# Patient Record
Sex: FEMALE | Race: WHITE | Employment: FULL TIME | URBAN - METROPOLITAN AREA
[De-identification: names, ages, dates, MRNs, and addresses within clinical notes are randomized per-mention and may not be internally consistent; named-entity substitution may affect disease eponyms.]

---

## 2019-05-21 RX ORDER — PRENATAL VIT/IRON FUM/FOLIC AC 27MG-0.8MG
1 TABLET ORAL DAILY
COMMUNITY

## 2019-05-23 ENCOUNTER — ANESTHESIA (OUTPATIENT)
Dept: SURGERY | Facility: CLINIC | Age: 39
DRG: 330 | End: 2019-05-23
Payer: COMMERCIAL

## 2019-05-23 ENCOUNTER — ANESTHESIA EVENT (OUTPATIENT)
Dept: SURGERY | Facility: CLINIC | Age: 39
DRG: 330 | End: 2019-05-23
Payer: COMMERCIAL

## 2019-05-23 ENCOUNTER — HOSPITAL ENCOUNTER (INPATIENT)
Facility: CLINIC | Age: 39
LOS: 4 days | Discharge: HOME | DRG: 330 | End: 2019-05-27
Attending: COLON & RECTAL SURGERY | Admitting: COLON & RECTAL SURGERY
Payer: COMMERCIAL

## 2019-05-23 DIAGNOSIS — N82.3 RECTOVAGINAL FISTULA: Primary | ICD-10-CM

## 2019-05-23 LAB
HCG UR QL: NEGATIVE
PLATELET # BLD AUTO: 189 10E9/L (ref 150–450)

## 2019-05-23 PROCEDURE — 25800030 ZZH RX IP 258 OP 636: Performed by: NURSE ANESTHETIST, CERTIFIED REGISTERED

## 2019-05-23 PROCEDURE — 25000125 ZZHC RX 250: Performed by: COLON & RECTAL SURGERY

## 2019-05-23 PROCEDURE — 12000000 ZZH R&B MED SURG/OB

## 2019-05-23 PROCEDURE — 25000125 ZZHC RX 250: Performed by: NURSE ANESTHETIST, CERTIFIED REGISTERED

## 2019-05-23 PROCEDURE — 37000009 ZZH ANESTHESIA TECHNICAL FEE, EACH ADDTL 15 MIN: Performed by: COLON & RECTAL SURGERY

## 2019-05-23 PROCEDURE — 37000008 ZZH ANESTHESIA TECHNICAL FEE, 1ST 30 MIN: Performed by: COLON & RECTAL SURGERY

## 2019-05-23 PROCEDURE — 25800030 ZZH RX IP 258 OP 636: Performed by: ANESTHESIOLOGY

## 2019-05-23 PROCEDURE — 36415 COLL VENOUS BLD VENIPUNCTURE: CPT | Performed by: COLON & RECTAL SURGERY

## 2019-05-23 PROCEDURE — 25000128 H RX IP 250 OP 636: Performed by: NURSE ANESTHETIST, CERTIFIED REGISTERED

## 2019-05-23 PROCEDURE — 25000132 ZZH RX MED GY IP 250 OP 250 PS 637: Performed by: COLON & RECTAL SURGERY

## 2019-05-23 PROCEDURE — 0DQR0ZZ REPAIR ANAL SPHINCTER, OPEN APPROACH: ICD-10-PCS | Performed by: COLON & RECTAL SURGERY

## 2019-05-23 PROCEDURE — 0DQP0ZZ REPAIR RECTUM, OPEN APPROACH: ICD-10-PCS | Performed by: COLON & RECTAL SURGERY

## 2019-05-23 PROCEDURE — 0UQG0ZZ REPAIR VAGINA, OPEN APPROACH: ICD-10-PCS | Performed by: COLON & RECTAL SURGERY

## 2019-05-23 PROCEDURE — 40000170 ZZH STATISTIC PRE-PROCEDURE ASSESSMENT II: Performed by: COLON & RECTAL SURGERY

## 2019-05-23 PROCEDURE — 25000125 ZZHC RX 250: Performed by: ANESTHESIOLOGY

## 2019-05-23 PROCEDURE — 71000012 ZZH RECOVERY PHASE 1 LEVEL 1 FIRST HR: Performed by: COLON & RECTAL SURGERY

## 2019-05-23 PROCEDURE — 0DJD8ZZ INSPECTION OF LOWER INTESTINAL TRACT, VIA NATURAL OR ARTIFICIAL OPENING ENDOSCOPIC: ICD-10-PCS | Performed by: COLON & RECTAL SURGERY

## 2019-05-23 PROCEDURE — 25800030 ZZH RX IP 258 OP 636: Performed by: COLON & RECTAL SURGERY

## 2019-05-23 PROCEDURE — 81025 URINE PREGNANCY TEST: CPT | Performed by: ANESTHESIOLOGY

## 2019-05-23 PROCEDURE — 85049 AUTOMATED PLATELET COUNT: CPT | Performed by: COLON & RECTAL SURGERY

## 2019-05-23 PROCEDURE — 25000128 H RX IP 250 OP 636: Performed by: COLON & RECTAL SURGERY

## 2019-05-23 PROCEDURE — 36000056 ZZH SURGERY LEVEL 3 1ST 30 MIN: Performed by: COLON & RECTAL SURGERY

## 2019-05-23 PROCEDURE — 36000058 ZZH SURGERY LEVEL 3 EA 15 ADDTL MIN: Performed by: COLON & RECTAL SURGERY

## 2019-05-23 PROCEDURE — 27210794 ZZH OR GENERAL SUPPLY STERILE: Performed by: COLON & RECTAL SURGERY

## 2019-05-23 PROCEDURE — 25000566 ZZH SEVOFLURANE, EA 15 MIN: Performed by: COLON & RECTAL SURGERY

## 2019-05-23 RX ORDER — ACETAMINOPHEN 325 MG/1
975 TABLET ORAL ONCE
Status: COMPLETED | OUTPATIENT
Start: 2019-05-23 | End: 2019-05-23

## 2019-05-23 RX ORDER — HYDROMORPHONE HYDROCHLORIDE 1 MG/ML
.3-.5 INJECTION, SOLUTION INTRAMUSCULAR; INTRAVENOUS; SUBCUTANEOUS EVERY 5 MIN PRN
Status: DISCONTINUED | OUTPATIENT
Start: 2019-05-23 | End: 2019-05-23 | Stop reason: HOSPADM

## 2019-05-23 RX ORDER — NALOXONE HYDROCHLORIDE 0.4 MG/ML
.1-.4 INJECTION, SOLUTION INTRAMUSCULAR; INTRAVENOUS; SUBCUTANEOUS
Status: DISCONTINUED | OUTPATIENT
Start: 2019-05-23 | End: 2019-05-27 | Stop reason: HOSPADM

## 2019-05-23 RX ORDER — ONDANSETRON 2 MG/ML
INJECTION INTRAMUSCULAR; INTRAVENOUS PRN
Status: DISCONTINUED | OUTPATIENT
Start: 2019-05-23 | End: 2019-05-23

## 2019-05-23 RX ORDER — PROPOFOL 10 MG/ML
INJECTION, EMULSION INTRAVENOUS CONTINUOUS PRN
Status: DISCONTINUED | OUTPATIENT
Start: 2019-05-23 | End: 2019-05-23

## 2019-05-23 RX ORDER — LIDOCAINE 40 MG/G
CREAM TOPICAL
Status: DISCONTINUED | OUTPATIENT
Start: 2019-05-23 | End: 2019-05-27 | Stop reason: HOSPADM

## 2019-05-23 RX ORDER — ONDANSETRON 4 MG/1
4 TABLET, ORALLY DISINTEGRATING ORAL EVERY 6 HOURS PRN
Status: DISCONTINUED | OUTPATIENT
Start: 2019-05-23 | End: 2019-05-27 | Stop reason: HOSPADM

## 2019-05-23 RX ORDER — SODIUM CHLORIDE, SODIUM LACTATE, POTASSIUM CHLORIDE, CALCIUM CHLORIDE 600; 310; 30; 20 MG/100ML; MG/100ML; MG/100ML; MG/100ML
INJECTION, SOLUTION INTRAVENOUS CONTINUOUS
Status: DISCONTINUED | OUTPATIENT
Start: 2019-05-23 | End: 2019-05-24 | Stop reason: CLARIF

## 2019-05-23 RX ORDER — NALOXONE HYDROCHLORIDE 0.4 MG/ML
.1-.4 INJECTION, SOLUTION INTRAMUSCULAR; INTRAVENOUS; SUBCUTANEOUS
Status: DISCONTINUED | OUTPATIENT
Start: 2019-05-23 | End: 2019-05-23

## 2019-05-23 RX ORDER — ONDANSETRON 2 MG/ML
4 INJECTION INTRAMUSCULAR; INTRAVENOUS EVERY 30 MIN PRN
Status: DISCONTINUED | OUTPATIENT
Start: 2019-05-23 | End: 2019-05-23 | Stop reason: HOSPADM

## 2019-05-23 RX ORDER — ONDANSETRON 4 MG/1
4 TABLET, ORALLY DISINTEGRATING ORAL EVERY 30 MIN PRN
Status: DISCONTINUED | OUTPATIENT
Start: 2019-05-23 | End: 2019-05-23 | Stop reason: HOSPADM

## 2019-05-23 RX ORDER — MAGNESIUM HYDROXIDE 1200 MG/15ML
LIQUID ORAL PRN
Status: DISCONTINUED | OUTPATIENT
Start: 2019-05-23 | End: 2019-05-23 | Stop reason: HOSPADM

## 2019-05-23 RX ORDER — SODIUM CHLORIDE, SODIUM LACTATE, POTASSIUM CHLORIDE, CALCIUM CHLORIDE 600; 310; 30; 20 MG/100ML; MG/100ML; MG/100ML; MG/100ML
INJECTION, SOLUTION INTRAVENOUS CONTINUOUS
Status: DISCONTINUED | OUTPATIENT
Start: 2019-05-23 | End: 2019-05-23 | Stop reason: HOSPADM

## 2019-05-23 RX ORDER — ONDANSETRON 2 MG/ML
4 INJECTION INTRAMUSCULAR; INTRAVENOUS EVERY 6 HOURS PRN
Status: DISCONTINUED | OUTPATIENT
Start: 2019-05-23 | End: 2019-05-27 | Stop reason: HOSPADM

## 2019-05-23 RX ORDER — BUPIVACAINE HYDROCHLORIDE AND EPINEPHRINE 2.5; 5 MG/ML; UG/ML
INJECTION, SOLUTION INFILTRATION; PERINEURAL PRN
Status: DISCONTINUED | OUTPATIENT
Start: 2019-05-23 | End: 2019-05-23 | Stop reason: HOSPADM

## 2019-05-23 RX ORDER — NEOSTIGMINE METHYLSULFATE 1 MG/ML
VIAL (ML) INJECTION PRN
Status: DISCONTINUED | OUTPATIENT
Start: 2019-05-23 | End: 2019-05-23

## 2019-05-23 RX ORDER — FENTANYL CITRATE 50 UG/ML
25-50 INJECTION, SOLUTION INTRAMUSCULAR; INTRAVENOUS
Status: DISCONTINUED | OUTPATIENT
Start: 2019-05-23 | End: 2019-05-23 | Stop reason: HOSPADM

## 2019-05-23 RX ORDER — FENTANYL CITRATE 50 UG/ML
INJECTION, SOLUTION INTRAMUSCULAR; INTRAVENOUS PRN
Status: DISCONTINUED | OUTPATIENT
Start: 2019-05-23 | End: 2019-05-23

## 2019-05-23 RX ORDER — ONDANSETRON 4 MG/1
4 TABLET, FILM COATED ORAL EVERY 8 HOURS PRN
COMMUNITY

## 2019-05-23 RX ORDER — DIPHENHYDRAMINE HYDROCHLORIDE 50 MG/ML
25 INJECTION INTRAMUSCULAR; INTRAVENOUS EVERY 6 HOURS PRN
Status: DISCONTINUED | OUTPATIENT
Start: 2019-05-23 | End: 2019-05-27 | Stop reason: HOSPADM

## 2019-05-23 RX ORDER — PRENATAL VIT/IRON FUM/FOLIC AC 27MG-0.8MG
1 TABLET ORAL DAILY
Status: DISCONTINUED | OUTPATIENT
Start: 2019-05-23 | End: 2019-05-27 | Stop reason: HOSPADM

## 2019-05-23 RX ORDER — PROPOFOL 10 MG/ML
INJECTION, EMULSION INTRAVENOUS PRN
Status: DISCONTINUED | OUTPATIENT
Start: 2019-05-23 | End: 2019-05-23

## 2019-05-23 RX ORDER — CEFOTETAN DISODIUM 1 G/10ML
1 INJECTION, POWDER, FOR SOLUTION INTRAMUSCULAR; INTRAVENOUS SEE ADMIN INSTRUCTIONS
Status: DISCONTINUED | OUTPATIENT
Start: 2019-05-23 | End: 2019-05-23 | Stop reason: HOSPADM

## 2019-05-23 RX ORDER — HYDROMORPHONE HYDROCHLORIDE 1 MG/ML
.3-.5 INJECTION, SOLUTION INTRAMUSCULAR; INTRAVENOUS; SUBCUTANEOUS
Status: DISCONTINUED | OUTPATIENT
Start: 2019-05-23 | End: 2019-05-27 | Stop reason: HOSPADM

## 2019-05-23 RX ORDER — HEPARIN SODIUM 5000 [USP'U]/.5ML
5000 INJECTION, SOLUTION INTRAVENOUS; SUBCUTANEOUS EVERY 8 HOURS
Status: DISCONTINUED | OUTPATIENT
Start: 2019-05-24 | End: 2019-05-27 | Stop reason: HOSPADM

## 2019-05-23 RX ORDER — ACETAMINOPHEN 325 MG/1
975 TABLET ORAL EVERY 6 HOURS PRN
Status: DISCONTINUED | OUTPATIENT
Start: 2019-05-23 | End: 2019-05-27 | Stop reason: HOSPADM

## 2019-05-23 RX ORDER — GINSENG 100 MG
CAPSULE ORAL PRN
Status: DISCONTINUED | OUTPATIENT
Start: 2019-05-23 | End: 2019-05-23 | Stop reason: HOSPADM

## 2019-05-23 RX ORDER — CEFOTETAN DISODIUM 1 G/10ML
1 INJECTION, POWDER, FOR SOLUTION INTRAMUSCULAR; INTRAVENOUS
Status: COMPLETED | OUTPATIENT
Start: 2019-05-23 | End: 2019-05-23

## 2019-05-23 RX ORDER — LIDOCAINE HYDROCHLORIDE 20 MG/ML
INJECTION, SOLUTION INFILTRATION; PERINEURAL PRN
Status: DISCONTINUED | OUTPATIENT
Start: 2019-05-23 | End: 2019-05-23

## 2019-05-23 RX ORDER — DIPHENHYDRAMINE HCL 25 MG
25 CAPSULE ORAL EVERY 6 HOURS PRN
Status: DISCONTINUED | OUTPATIENT
Start: 2019-05-23 | End: 2019-05-27 | Stop reason: HOSPADM

## 2019-05-23 RX ORDER — DIAZEPAM 5 MG
5 TABLET ORAL EVERY 6 HOURS PRN
Status: DISCONTINUED | OUTPATIENT
Start: 2019-05-23 | End: 2019-05-27 | Stop reason: HOSPADM

## 2019-05-23 RX ORDER — GLYCOPYRROLATE 0.2 MG/ML
INJECTION, SOLUTION INTRAMUSCULAR; INTRAVENOUS PRN
Status: DISCONTINUED | OUTPATIENT
Start: 2019-05-23 | End: 2019-05-23

## 2019-05-23 RX ORDER — DEXAMETHASONE SODIUM PHOSPHATE 4 MG/ML
INJECTION, SOLUTION INTRA-ARTICULAR; INTRALESIONAL; INTRAMUSCULAR; INTRAVENOUS; SOFT TISSUE PRN
Status: DISCONTINUED | OUTPATIENT
Start: 2019-05-23 | End: 2019-05-23

## 2019-05-23 RX ADMIN — SODIUM CHLORIDE, POTASSIUM CHLORIDE, SODIUM LACTATE AND CALCIUM CHLORIDE: 600; 310; 30; 20 INJECTION, SOLUTION INTRAVENOUS at 07:57

## 2019-05-23 RX ADMIN — SODIUM CHLORIDE, POTASSIUM CHLORIDE, SODIUM LACTATE AND CALCIUM CHLORIDE: 600; 310; 30; 20 INJECTION, SOLUTION INTRAVENOUS at 22:53

## 2019-05-23 RX ADMIN — PROPOFOL 30 MG: 10 INJECTION, EMULSION INTRAVENOUS at 10:42

## 2019-05-23 RX ADMIN — FENTANYL CITRATE 50 MCG: 50 INJECTION, SOLUTION INTRAMUSCULAR; INTRAVENOUS at 09:19

## 2019-05-23 RX ADMIN — HYDROMORPHONE HYDROCHLORIDE 0.5 MG: 1 INJECTION, SOLUTION INTRAMUSCULAR; INTRAVENOUS; SUBCUTANEOUS at 09:43

## 2019-05-23 RX ADMIN — ONDANSETRON 4 MG: 2 INJECTION INTRAMUSCULAR; INTRAVENOUS at 11:10

## 2019-05-23 RX ADMIN — PROPOFOL 170 MG: 10 INJECTION, EMULSION INTRAVENOUS at 09:22

## 2019-05-23 RX ADMIN — PHENYLEPHRINE HYDROCHLORIDE 100 MCG: 10 INJECTION INTRAVENOUS at 10:53

## 2019-05-23 RX ADMIN — HYDROMORPHONE HYDROCHLORIDE 0.25 MG: 1 INJECTION, SOLUTION INTRAMUSCULAR; INTRAVENOUS; SUBCUTANEOUS at 10:46

## 2019-05-23 RX ADMIN — MIDAZOLAM 1 MG: 1 INJECTION INTRAMUSCULAR; INTRAVENOUS at 09:14

## 2019-05-23 RX ADMIN — PHENYLEPHRINE HYDROCHLORIDE 100 MCG: 10 INJECTION INTRAVENOUS at 10:14

## 2019-05-23 RX ADMIN — HYDROMORPHONE HYDROCHLORIDE 0.5 MG: 1 INJECTION, SOLUTION INTRAMUSCULAR; INTRAVENOUS; SUBCUTANEOUS at 17:32

## 2019-05-23 RX ADMIN — HYDROMORPHONE HYDROCHLORIDE 0.5 MG: 1 INJECTION, SOLUTION INTRAMUSCULAR; INTRAVENOUS; SUBCUTANEOUS at 20:22

## 2019-05-23 RX ADMIN — PROPOFOL 30 MCG/KG/MIN: 10 INJECTION, EMULSION INTRAVENOUS at 09:32

## 2019-05-23 RX ADMIN — LIDOCAINE HYDROCHLORIDE 60 MG: 20 INJECTION, SOLUTION INFILTRATION; PERINEURAL at 09:22

## 2019-05-23 RX ADMIN — HYDROMORPHONE HYDROCHLORIDE 0.3 MG: 1 INJECTION, SOLUTION INTRAMUSCULAR; INTRAVENOUS; SUBCUTANEOUS at 13:19

## 2019-05-23 RX ADMIN — PHENYLEPHRINE HYDROCHLORIDE 50 MCG: 10 INJECTION INTRAVENOUS at 10:05

## 2019-05-23 RX ADMIN — CEFOTETAN DISODIUM 1 G: 1 INJECTION, POWDER, FOR SOLUTION INTRAMUSCULAR; INTRAVENOUS at 09:26

## 2019-05-23 RX ADMIN — ACETAMINOPHEN 975 MG: 325 TABLET, FILM COATED ORAL at 20:22

## 2019-05-23 RX ADMIN — HYDROMORPHONE HYDROCHLORIDE 0.3 MG: 1 INJECTION, SOLUTION INTRAMUSCULAR; INTRAVENOUS; SUBCUTANEOUS at 15:26

## 2019-05-23 RX ADMIN — DEXAMETHASONE SODIUM PHOSPHATE 4 MG: 4 INJECTION, SOLUTION INTRA-ARTICULAR; INTRALESIONAL; INTRAMUSCULAR; INTRAVENOUS; SOFT TISSUE at 09:32

## 2019-05-23 RX ADMIN — MIDAZOLAM 1 MG: 1 INJECTION INTRAMUSCULAR; INTRAVENOUS at 09:19

## 2019-05-23 RX ADMIN — LIDOCAINE HYDROCHLORIDE 0.5 ML: 10 INJECTION, SOLUTION EPIDURAL; INFILTRATION; INTRACAUDAL; PERINEURAL at 07:57

## 2019-05-23 RX ADMIN — NEOSTIGMINE METHYLSULFATE 3 MG: 1 INJECTION, SOLUTION INTRAVENOUS at 11:23

## 2019-05-23 RX ADMIN — ACETAMINOPHEN 975 MG: 325 TABLET, FILM COATED ORAL at 07:57

## 2019-05-23 RX ADMIN — SODIUM CHLORIDE, POTASSIUM CHLORIDE, SODIUM LACTATE AND CALCIUM CHLORIDE: 600; 310; 30; 20 INJECTION, SOLUTION INTRAVENOUS at 10:27

## 2019-05-23 RX ADMIN — FENTANYL CITRATE 50 MCG: 50 INJECTION, SOLUTION INTRAMUSCULAR; INTRAVENOUS at 09:22

## 2019-05-23 RX ADMIN — GLYCOPYRROLATE 0.5 MG: 0.2 INJECTION, SOLUTION INTRAMUSCULAR; INTRAVENOUS at 11:23

## 2019-05-23 RX ADMIN — PHENYLEPHRINE HYDROCHLORIDE 100 MCG: 10 INJECTION INTRAVENOUS at 10:33

## 2019-05-23 RX ADMIN — ROCURONIUM BROMIDE 50 MG: 10 INJECTION INTRAVENOUS at 09:22

## 2019-05-23 RX ADMIN — PHENYLEPHRINE HYDROCHLORIDE 50 MCG: 10 INJECTION INTRAVENOUS at 11:05

## 2019-05-23 RX ADMIN — HYDROMORPHONE HYDROCHLORIDE 0.5 MG: 1 INJECTION, SOLUTION INTRAMUSCULAR; INTRAVENOUS; SUBCUTANEOUS at 22:57

## 2019-05-23 RX ADMIN — PRENATAL VIT W/ FE FUMARATE-FA TAB 27-0.8 MG 1 TABLET: 27-0.8 TAB at 13:54

## 2019-05-23 RX ADMIN — FENTANYL CITRATE 50 MCG: 50 INJECTION, SOLUTION INTRAMUSCULAR; INTRAVENOUS at 10:42

## 2019-05-23 ASSESSMENT — COPD QUESTIONNAIRES: COPD: 0

## 2019-05-23 ASSESSMENT — ACTIVITIES OF DAILY LIVING (ADL)
ADLS_ACUITY_SCORE: 12
ADLS_ACUITY_SCORE: 12

## 2019-05-23 NOTE — ANESTHESIA PREPROCEDURE EVALUATION
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             ROS/MED HX    ENT/Pulmonary:      (-) asthma, COPD and sleep apnea   Neurologic:      (-) CVA and TIA   Cardiovascular:         METS/Exercise Tolerance:     Hematologic:         Musculoskeletal:         GI/Hepatic:     (+) Other GI/Hepatic      Hepatitis: rectalvaginal fistula.   Renal/Genitourinary:         Endo:         Psychiatric:     (+) psychiatric history anxiety and depression      Infectious Disease:         Malignancy:         Other:                          Physical Exam  Normal systems: dental    Airway   Mallampati: II  TM distance: >3 FB  Neck ROM: full    Dental     Cardiovascular   Rhythm and rate: regular      Pulmonary    breath sounds clear to auscultation            Lab Results   Component Value Date    HCG Negative 2019       Preop Vitals  BP Readings from Last 3 Encounters:   No data found for BP    Pulse Readings from Last 3 Encounters:   No data found for Pulse      Resp Readings from Last 3 Encounters:   No data found for Resp    SpO2 Readings from Last 3 Encounters:   No data found for SpO2      Temp Readings from Last 1 Encounters:   No data found for Temp    Ht Readings from Last 1 Encounters:   No data found for Ht      Wt Readings from Last 1 Encounters:   No data found for Wt    There is no height or weight on file to calculate BMI.       Anesthesia Plan      History & Physical Review  History and physical reviewed and following examination; no interval change.    ASA Status:  1 .    NPO Status:  > 8  hours    Plan for General and ETT   PONV prophylaxis:  Ondansetron (or other 5HT-3) and Dexamethasone or Solumedrol       Postoperative Care      Consents  Anesthetic plan, risks, benefits and alternatives discussed with:  Patient..                 Precious Lindo

## 2019-05-23 NOTE — BRIEF OP NOTE
[facility]    Brief Operative Note    Pre-operative diagnosis: RECTOVAGINAL FISTULA   Post-operative diagnosis same  Procedure: Procedure(s):  SPHINCTEROPLASTY, RECTUM, REPAIR OF RECTAL VAGINAL FISTULA  Surgeon: Surgeon(s) and Role:     * Elza Hills MD - Primary     * Madeleine Caceres MD - Fellow - Assisting  Anesthesia: General   Estimated blood loss: Less than 50 ml  Drains:  penrose drain in perineum   Specimens: * None  Findings:   rectovaginal fistula anterior midline, 1m from anal verge.  Complications: None.

## 2019-05-23 NOTE — PLAN OF CARE
A/O x4. AVSS on RA. Up w/ SBA. Pain managed w/ PRN IV dilaudid. Vaginal packing in place. Penrose drain, bloody drainage, fluffs in place. Tolerating clear liquid diet. Basilio in place, patent, adequate output.

## 2019-05-23 NOTE — PROGRESS NOTES
Admission medication history interview status for the 5/23/2019  admission is complete. See EPIC admission navigator for prior to admission medications     Medication history source reliability:Good    Medication history interview source(s):Patient    Medication history resources (including written lists, pill bottles, clinic record):None    Primary pharmacy.[facility]    Additional medication history information not noted on PTA med list :    Patient completed pre-op antibiotics:  Neomycin 1000mg tid X 1 day - last dose on 5/22/19 at 2300  Metronidazole 500mg tid X 1 day - last dose on 5/22/19 at 2300    Time spent in this activity: 45 minutes    Prior to Admission medications    Medication Sig Last Dose Taking? Auth Provider   CALCIUM PO Take 1 tablet by mouth daily 5/21/2019 Yes Reported, Patient   Prenatal Vit-Fe Fumarate-FA (PRENATAL MULTIVITAMIN W/IRON) 27-0.8 MG tablet Take 1 tablet by mouth daily 5/21/2019 Yes Reported, Patient   Probiotic Product (PROBIOTIC PO) Take 1 tablet by mouth daily 5/21/2019 Yes Reported, Patient

## 2019-05-23 NOTE — OP NOTE
Procedure Date: 05/23/2019      PREOPERATIVE DIAGNOSIS:  Rectovaginal fistula with sphincter injury.      POSTOPERATIVE DIAGNOSIS:  Rectovaginal fistula with sphincter injury.      PROCEDURE:  Anal sphincteroplasty with repair of rectovaginal fistula.      SURGEON:  Elza Hills MD      ASSISTANT:  Madeleine Caceres MD      ANESTHESIA:  General.      INDICATIONS:  The patient is a 38-year-old woman who had symptoms of the passage of flatus and stool through her vagina.  Evaluation revealed the presence of a rectovaginal fistula as well as anal ultrasound revealed that anterior sphincter defect in the mid and proximal anal canal.  The options of an endorectal advancement flap and an anal sphincteroplasty were discussed with the patient.  After that discussion, the patient has chosen to proceed with an anal sphincteroplasty.  The nature of the procedure, the usual hospital stay, recovery and risks were reviewed with the patient.  All of her questions were answered.  She understands and wishes to proceed.      OPERATIVE PROCEDURE:  After an outpatient prep, the patient was brought to the operating room.  Her legs were placed in pneumatic compression stockings.  She received intravenous antibiotics.  After the induction of adequate general anesthesia, she in the prone jackknife position.  Her buttocks were taped apart for exposure.  The area was prepped and draped in the usual sterile manner.  A timeout was performed and everyone present in the operating room agreed to the planned procedure.  Inspection of the anal canal with a bivalve anoscope revealed that there was clear evidence of a rectovaginal fistula.  A probe could easily be passed through this into the vagina.  There was very little bulk to the sphincter muscle and there was no separation and other than the rectal and vaginal walls in that area.  The curvilinear incision was made between the rectum and the vagina and extended down through the subcutaneous tissue.   The [place] retractor was put in place.  The sphincter muscle was then mobilized from the ischiorectal fossa.  The distal sphincter was intact, but at the more proximal sphincter there was a complete defect.  This mobilization was taken up laterally to the level of the levators.  Anteriorly, we were mobilized the sphincter muscle and subsequent scar off the posterior vaginal wall.  When we encountered the fistula, we began to dissect the adherent vaginal wall from the rectal wall.  In order to improve visualization, we changed and we changed to mobilization of the anoderm and rectal mucosa off the sphincter muscle and into the rectovaginal septum.  This allowed us to separate the rectal wall from the vaginal wall.  We continued this mobilization until we were clearly proximal to the fistula opening and in soft tissue in the rectovaginal septum.  When that dissection was complete and we completely  the posterior vaginal wall from the anterior rectal wall and we were well into the septum, the wound was irrigated with saline.  Hemostasis was achieved either with cautery or 3-0 Vicryl suture ligation.  The vaginal hole was then closed with a continuous suture of 2-0 Vicryl and then that closure was imbricated with interrupted 2-0 Vicryl sutures.  The rectal wall at the area of the fistula was then trimmed back to healthy tissue.  We then, having sufficient mobilization, sewed the proximal edge of the fistula to the anoderm.  This was done with interrupted 3-0 Vicryl sutures when the blood supply was good and the closure was secure.  We then approximated the proximal and mid portion of the duct  the sphincter occurred in the midline.  After they were approximated, the suture line of the sphincter repair was then reinforced with Lembert sutures in the mid and proximal anal canal.  In the distal anal canal, the intact sphincter muscle was imbricated with 2-0 PDS suture.  When hemostasis was checked for  and felt to be adequate, the subcutaneous tissue was closed with 3-0 Vicryl suture.  The anoderm was tacked to the sphincter repair with 3-0 Vicryl suture.  The quarter-inch Penrose drain was placed in the rectovaginal septum.  The anal end of the incision, which was now being closed in a vertical fashion, was partially closed with 3-0 Vicryl suture.  The vaginal side was closed with a subcuticular suture of 4-0 Monocryl.  A was well-lubricated vaginal packing was placed into the vagina to with hemostasis.  A perianal nerve block using 0.25% Marcaine with epinephrine was then induced.  Fluff dressings were placed over the repair.  All sponge, blade and needle counts were reported as correct x 2.  The patient was extubated and transferred to the recovery room in stable condition.         DARWIN ALBERT MD             D: 2019   T: 2019   MT: GH      Name:     TAWNY GEIGER   MRN:      [id number]        Account:        [id number]   :      1980           Procedure Date: 2019      Document: [id number]       cc: Darwin GEIGER MD

## 2019-05-23 NOTE — PROGRESS NOTES
Patient completed pre-op antibiotics:  Neomycin 1000mg tid X 1 day - last dose on 5/22/19 at 2300  Metronidazole 500mg tid X 1 day - last dose on 5/22/19 at 2300

## 2019-05-23 NOTE — ANESTHESIA CARE TRANSFER NOTE
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    Anesthesia Type:   General, ETT     Note:  Airway :Face Mask  Patient transferred to:PACU  Comments: Spont. Resps, pt. Responding.  Extubated, sufficient air exchange. To PACU VSS, Monitors on. Report to RNHandoff Report: Identifed the Patient, Identified the Reponsible Provider, Reviewed the pertinent medical history, Discussed the surgical course, Reviewed Intra-OP anesthesia mangement and issues during anesthesia, Set expectations for post-procedure period and Allowed opportunity for questions and acknowledgement of understanding      Vitals: (Last set prior to Anesthesia Care Transfer)    CRNA VITALS  5/23/2019 1102 - 5/23/2019 1140      5/23/2019             Resp Rate (set):  10                Electronically Signed By: EVELYN Tong CRNA  May 23, 2019  11:40 AM

## 2019-05-24 LAB
ANION GAP SERPL CALCULATED.3IONS-SCNC: 6 MMOL/L (ref 3–14)
BUN SERPL-MCNC: 7 MG/DL (ref 7–30)
CALCIUM SERPL-MCNC: 8.6 MG/DL (ref 8.5–10.1)
CHLORIDE SERPL-SCNC: 104 MMOL/L (ref 94–109)
CO2 SERPL-SCNC: 30 MMOL/L (ref 20–32)
CREAT SERPL-MCNC: 0.58 MG/DL (ref 0.52–1.04)
GFR SERPL CREATININE-BSD FRML MDRD: >90 ML/MIN/{1.73_M2}
GLUCOSE SERPL-MCNC: 121 MG/DL (ref 70–99)
HGB BLD-MCNC: 12.4 G/DL (ref 11.7–15.7)
POTASSIUM SERPL-SCNC: 3.5 MMOL/L (ref 3.4–5.3)
SODIUM SERPL-SCNC: 140 MMOL/L (ref 133–144)

## 2019-05-24 PROCEDURE — 25000132 ZZH RX MED GY IP 250 OP 250 PS 637: Performed by: COLON & RECTAL SURGERY

## 2019-05-24 PROCEDURE — 80048 BASIC METABOLIC PNL TOTAL CA: CPT | Performed by: COLON & RECTAL SURGERY

## 2019-05-24 PROCEDURE — 36415 COLL VENOUS BLD VENIPUNCTURE: CPT | Performed by: COLON & RECTAL SURGERY

## 2019-05-24 PROCEDURE — 12000000 ZZH R&B MED SURG/OB

## 2019-05-24 PROCEDURE — 85018 HEMOGLOBIN: CPT | Performed by: COLON & RECTAL SURGERY

## 2019-05-24 PROCEDURE — 25000132 ZZH RX MED GY IP 250 OP 250 PS 637: Performed by: PHYSICIAN ASSISTANT

## 2019-05-24 PROCEDURE — 25000128 H RX IP 250 OP 636: Performed by: COLON & RECTAL SURGERY

## 2019-05-24 RX ORDER — HYDROMORPHONE HYDROCHLORIDE 2 MG/1
2-4 TABLET ORAL
Status: DISCONTINUED | OUTPATIENT
Start: 2019-05-24 | End: 2019-05-27 | Stop reason: HOSPADM

## 2019-05-24 RX ORDER — BETHANECHOL CHLORIDE 25 MG/1
25 TABLET ORAL 4 TIMES DAILY
Status: DISCONTINUED | OUTPATIENT
Start: 2019-05-24 | End: 2019-05-27 | Stop reason: HOSPADM

## 2019-05-24 RX ORDER — BETHANECHOL CHLORIDE 5 MG
5 TABLET ORAL 3 TIMES DAILY
Status: DISCONTINUED | OUTPATIENT
Start: 2019-05-24 | End: 2019-05-24

## 2019-05-24 RX ORDER — HYDROMORPHONE HYDROCHLORIDE 2 MG/1
2 TABLET ORAL
Status: DISCONTINUED | OUTPATIENT
Start: 2019-05-24 | End: 2019-05-24

## 2019-05-24 RX ADMIN — BETHANECHOL CHLORIDE 25 MG: 25 TABLET ORAL at 17:52

## 2019-05-24 RX ADMIN — PRENATAL VIT W/ FE FUMARATE-FA TAB 27-0.8 MG 1 TABLET: 27-0.8 TAB at 08:08

## 2019-05-24 RX ADMIN — METHYLCELLULOSE 1000 MG: 500 TABLET ORAL at 17:52

## 2019-05-24 RX ADMIN — HYDROMORPHONE HYDROCHLORIDE 0.3 MG: 1 INJECTION, SOLUTION INTRAMUSCULAR; INTRAVENOUS; SUBCUTANEOUS at 03:10

## 2019-05-24 RX ADMIN — ACETAMINOPHEN 975 MG: 325 TABLET, FILM COATED ORAL at 03:13

## 2019-05-24 RX ADMIN — DIAZEPAM 5 MG: 5 TABLET ORAL at 09:44

## 2019-05-24 RX ADMIN — HYDROMORPHONE HYDROCHLORIDE 0.3 MG: 1 INJECTION, SOLUTION INTRAMUSCULAR; INTRAVENOUS; SUBCUTANEOUS at 01:01

## 2019-05-24 RX ADMIN — BETHANECHOL CHLORIDE 5 MG: 5 TABLET ORAL at 15:43

## 2019-05-24 RX ADMIN — HYDROMORPHONE HYDROCHLORIDE 2 MG: 2 TABLET ORAL at 17:33

## 2019-05-24 RX ADMIN — HYDROMORPHONE HYDROCHLORIDE 0.3 MG: 1 INJECTION, SOLUTION INTRAMUSCULAR; INTRAVENOUS; SUBCUTANEOUS at 11:40

## 2019-05-24 RX ADMIN — HYDROMORPHONE HYDROCHLORIDE 2 MG: 2 TABLET ORAL at 14:01

## 2019-05-24 RX ADMIN — HYDROMORPHONE HYDROCHLORIDE 4 MG: 2 TABLET ORAL at 20:47

## 2019-05-24 RX ADMIN — ACETAMINOPHEN 975 MG: 325 TABLET, FILM COATED ORAL at 15:23

## 2019-05-24 RX ADMIN — HEPARIN SODIUM 5000 UNITS: 5000 INJECTION, SOLUTION INTRAVENOUS; SUBCUTANEOUS at 17:33

## 2019-05-24 RX ADMIN — BETHANECHOL CHLORIDE 5 MG: 5 TABLET ORAL at 12:11

## 2019-05-24 RX ADMIN — HEPARIN SODIUM 5000 UNITS: 5000 INJECTION, SOLUTION INTRAVENOUS; SUBCUTANEOUS at 09:19

## 2019-05-24 RX ADMIN — BETHANECHOL CHLORIDE 25 MG: 25 TABLET ORAL at 23:34

## 2019-05-24 RX ADMIN — ACETAMINOPHEN 975 MG: 325 TABLET, FILM COATED ORAL at 09:19

## 2019-05-24 RX ADMIN — HYDROMORPHONE HYDROCHLORIDE 0.5 MG: 1 INJECTION, SOLUTION INTRAMUSCULAR; INTRAVENOUS; SUBCUTANEOUS at 06:20

## 2019-05-24 RX ADMIN — DIAZEPAM 5 MG: 5 TABLET ORAL at 15:43

## 2019-05-24 ASSESSMENT — ACTIVITIES OF DAILY LIVING (ADL)
ADLS_ACUITY_SCORE: 12

## 2019-05-24 NOTE — PROGRESS NOTES
[facility]  PROGRESS NOTE    May 24, 2019  Post-op Day # 1 s/p Anal sphincteroplasty with repair of rectovaginal fistula.     SUBJECTIVE:  Pt resting in bed upon arrival. States her pain is controlled, denies fevers, N/V and is tolerating clear liquids. +minimal flatus, no BM yet. Basilio removed at 0600, patient voided 250 with PVR 910ml. Feels uncomfortable like she has the need to urinate. She has been up ambulating. Vaginal packing removed this morning.     OBJECTIVE:  Temp:  [98.1  F (36.7  C)-99  F (37.2  C)] 99  F (37.2  C)  Pulse:  [71-96] 71  Heart Rate:  [52-83] 64  Resp:  [10-18] 16  BP: ()/(46-75) 100/68  SpO2:  [92 %-100 %] 98 %    Intake/Output Summary (Last 24 hours) at 5/24/2019 1226  Last data filed at 5/24/2019 1141  Gross per 24 hour   Intake 2075 ml   Output 3445 ml   Net -1370 ml       GENERAL:  Awake, alert, no acute distress  HEAD: Normocephalic atraumatic  SCLERA: Anicteric  EXTREMITIES: Warm and well perfused  ABDOMEN:  Soft, appropriately tender, non-distended. No guarding, rigidity, or peritoneal signs.   RECTAL: Wound covered with gauze and mesh underwear with serosanguinous drainage. Penrose drain intact.   INCISION:  C/d/i    LABS:  No results found for: WBC  Lab Results   Component Value Date    HGB 12.4 05/24/2019     No results found for: HCT  Lab Results   Component Value Date     05/23/2019     Last Basic Metabolic Panel:  Lab Results   Component Value Date     05/24/2019      Lab Results   Component Value Date    POTASSIUM 3.5 05/24/2019     Lab Results   Component Value Date    CHLORIDE 104 05/24/2019     Lab Results   Component Value Date    MARTA 8.6 05/24/2019     Lab Results   Component Value Date    CO2 30 05/24/2019     Lab Results   Component Value Date    BUN 7 05/24/2019     Lab Results   Component Value Date    CR 0.58 05/24/2019     Lab Results   Component Value Date     05/24/2019       ASSESSMENT/PLAN: 37 yo female POD #1 s/p anal  sphincteroplasty with repair of rectovaginal fistula. AVSS, pain controlled and denies nausea. Some difficulty voiding, spoke with Dr Hills who recommended urecholine trial and if unsuccessful, patient should be straight cathed. If still having difficulty voiding after straight cath, recommended replacing reyes catheter. Will advance to regular diet. Continue to ambulate. Await return of bowel function. When begins having flatus or BMs, will begin tap water enemas to prevent constipation.    1. Advance to regular diet  2. PRN pain medication, oral dilaudid   3. OOB, ambulate QID  4. Heparin for ppx  5. Administer urecholine for urinary retention, if still difficulties, straight cath once. If continual difficulty, replace reyes catheter.   6. AROBF. Once passing flatus and/or BM, will begin tap water enemas    This plan was discussed with Dr Hills.      For questions/paging, please contact the CRS office at [phone].    Merary Elkins PA-C  [facility]  Phone: [phone]

## 2019-05-24 NOTE — PROGRESS NOTES
POD#1   Sphincteroplasty and repair of rectovaginal fistula  Patient up and walking and states pain is controlled. She has been able to void but not to empty her bladder completely. That is causing some discomfort. Tolerating a diet without nausea or emesis.   /74 (BP Location: Right arm)   Pulse 81   Temp 98.1  F (36.7  C) (Oral)   Resp 18   SpO2 99%     Intake/Output Summary (Last 24 hours) at 5/24/2019 1719  Last data filed at 5/24/2019 1600  Gross per 24 hour   Intake 1939 ml   Output 4495 ml   Net -2556 ml     Alert and appears comfortable other than rhinitis    Impression: Doing well for post-op day one other than urinary retention.   Plan: increase dose of urecholine. If unable to more completely void, straight cath. If another straight cath is needed, leave reyes in.     Add fiber supplement daily.     Start enemas through a red rubber catheter tomorrow (200cc tap water). Patient needs to be instructed how to do them at home.  Discharge ONLY after she has a bowel movement.     Elza Hills MD

## 2019-05-24 NOTE — PROGRESS NOTES
Patient unable to void after 0620 reyes removal, attempted x3 w/ambulation, peppermint oil, and no success. Bladder scanned for over 653 ml. Gave 5 mg PO valium and will try ambulating again. If no success, will straight cath.

## 2019-05-25 LAB — GLUCOSE BLDC GLUCOMTR-MCNC: 79 MG/DL (ref 70–99)

## 2019-05-25 PROCEDURE — 12000000 ZZH R&B MED SURG/OB

## 2019-05-25 PROCEDURE — 25000128 H RX IP 250 OP 636: Performed by: COLON & RECTAL SURGERY

## 2019-05-25 PROCEDURE — 00000146 ZZHCL STATISTIC GLUCOSE BY METER IP

## 2019-05-25 PROCEDURE — 25000132 ZZH RX MED GY IP 250 OP 250 PS 637: Performed by: COLON & RECTAL SURGERY

## 2019-05-25 RX ORDER — HYDROMORPHONE HYDROCHLORIDE 2 MG/1
2-4 TABLET ORAL EVERY 6 HOURS PRN
Qty: 20 TABLET | Refills: 0 | Status: SHIPPED | OUTPATIENT
Start: 2019-05-25

## 2019-05-25 RX ADMIN — HEPARIN SODIUM 5000 UNITS: 5000 INJECTION, SOLUTION INTRAVENOUS; SUBCUTANEOUS at 18:47

## 2019-05-25 RX ADMIN — HYDROMORPHONE HYDROCHLORIDE 2 MG: 2 TABLET ORAL at 16:27

## 2019-05-25 RX ADMIN — HYDROMORPHONE HYDROCHLORIDE 4 MG: 2 TABLET ORAL at 19:44

## 2019-05-25 RX ADMIN — ACETAMINOPHEN 975 MG: 325 TABLET, FILM COATED ORAL at 21:37

## 2019-05-25 RX ADMIN — ACETAMINOPHEN 975 MG: 325 TABLET, FILM COATED ORAL at 14:28

## 2019-05-25 RX ADMIN — HEPARIN SODIUM 5000 UNITS: 5000 INJECTION, SOLUTION INTRAVENOUS; SUBCUTANEOUS at 09:54

## 2019-05-25 RX ADMIN — BETHANECHOL CHLORIDE 25 MG: 25 TABLET ORAL at 09:54

## 2019-05-25 RX ADMIN — ACETAMINOPHEN 975 MG: 325 TABLET, FILM COATED ORAL at 07:53

## 2019-05-25 RX ADMIN — HYDROMORPHONE HYDROCHLORIDE 2 MG: 2 TABLET ORAL at 05:21

## 2019-05-25 RX ADMIN — HEPARIN SODIUM 5000 UNITS: 5000 INJECTION, SOLUTION INTRAVENOUS; SUBCUTANEOUS at 00:33

## 2019-05-25 RX ADMIN — HYDROMORPHONE HYDROCHLORIDE 2 MG: 2 TABLET ORAL at 22:43

## 2019-05-25 RX ADMIN — ACETAMINOPHEN 975 MG: 325 TABLET, FILM COATED ORAL at 00:29

## 2019-05-25 RX ADMIN — BETHANECHOL CHLORIDE 25 MG: 25 TABLET ORAL at 22:43

## 2019-05-25 RX ADMIN — BETHANECHOL CHLORIDE 25 MG: 25 TABLET ORAL at 14:25

## 2019-05-25 RX ADMIN — BETHANECHOL CHLORIDE 25 MG: 25 TABLET ORAL at 18:48

## 2019-05-25 RX ADMIN — HYDROMORPHONE HYDROCHLORIDE 4 MG: 2 TABLET ORAL at 09:54

## 2019-05-25 RX ADMIN — HYDROMORPHONE HYDROCHLORIDE 2 MG: 2 TABLET ORAL at 00:29

## 2019-05-25 RX ADMIN — DIAZEPAM 5 MG: 5 TABLET ORAL at 07:53

## 2019-05-25 RX ADMIN — PRENATAL VIT W/ FE FUMARATE-FA TAB 27-0.8 MG 1 TABLET: 27-0.8 TAB at 09:54

## 2019-05-25 RX ADMIN — DIAZEPAM 5 MG: 5 TABLET ORAL at 21:39

## 2019-05-25 RX ADMIN — METHYLCELLULOSE 1000 MG: 500 TABLET ORAL at 09:54

## 2019-05-25 RX ADMIN — HYDROMORPHONE HYDROCHLORIDE 4 MG: 2 TABLET ORAL at 13:15

## 2019-05-25 ASSESSMENT — ACTIVITIES OF DAILY LIVING (ADL)
ADLS_ACUITY_SCORE: 14
ADLS_ACUITY_SCORE: 12
ADLS_ACUITY_SCORE: 14
ADLS_ACUITY_SCORE: 14

## 2019-05-25 NOTE — PROGRESS NOTES
[facility] Progress Note             Interval History:   Postop Day #: 2 s/p sphincteroplasty and repair of rectovaginal fistula.    SUBJECTIVE:  Doing well, some rectal pain, straight cath x 1 yesterday, has been able to urinate since, but small amounts.  Tolerating po intake.                 Medications:   I have reviewed this patient's current medications               Physical Exam:   Blood pressure 115/77, pulse 91, temperature 98.7  F (37.1  C), temperature source Oral, resp. rate 16, SpO2 92 %.    Intake/Output Summary (Last 24 hours) at 5/25/2019 1042  Last data filed at 5/25/2019 0131  Gross per 24 hour   Intake 350 ml   Output 2775 ml   Net -2425 ml     GEN:  Alert, oriented, NAD  ABD:  Soft, NT, ND  Rectal exam with incision with penrose drain in place with minimal serosang drainage, incision otherwise c/d/i         Data:        Lab Results   Component Value Date     05/24/2019    Lab Results   Component Value Date    CHLORIDE 104 05/24/2019    Lab Results   Component Value Date    BUN 7 05/24/2019      Lab Results   Component Value Date    POTASSIUM 3.5 05/24/2019    Lab Results   Component Value Date    CO2 30 05/24/2019    Lab Results   Component Value Date    CR 0.58 05/24/2019        Lab Results   Component Value Date    HGB 12.4 05/24/2019     Lab Results   Component Value Date     05/23/2019     No results found for: WBC         Assessment and Plan:   POD#2 s/p sphincteroplasty and repair of rectovaginal fistula.  - will start tap water enemas via red rubber catheter today  (200 ml tap water)  - Cont current diet  - if needs another straight cath will need reyes replaced  - possible discharge to home today if able to do enemas, no urinary retention and good pain control.      Zara León MD  [facility]  Office Phone # [phone]  Pager: [phone]

## 2019-05-25 NOTE — PLAN OF CARE
A/Ox4. AVSS on Ra. Up ind. Tolerating reg diet. Penrose drain in place with small serosanguinous drainage. Pain managed with PRN dilaudid. Rectal sites WDL.

## 2019-05-25 NOTE — PROGRESS NOTES
Notified by Dr Caceres regarding scripts for pt discharge medications. Will be up in the am to sign scripts. Per Dr Caceres would like pt to be able to void over half the total amount of void and PVR combined. Pt okay to not have an IV.

## 2019-05-25 NOTE — PLAN OF CARE
A&Ox4. AVSS. IND. LS clear. BS active, passing flatus. Tolerating regular diet. Penrose drain in place with small serosanguinous drainage. Pain managed with PRN dilaudid, tylenol, and valium. Rectal sites WDL. Enema done with teaching, small stool output. Retaining urine. 1500 PVR for 348mls. Continue to monitor.

## 2019-05-25 NOTE — PLAN OF CARE
A&Ox4. AVSS on RA. IND. Pain managed w/ PRN oral dilaudid, valium, and tylenol. Incision PAM. Penrose drain in place, bloody drainage. Tolerating regular diet. Voiding, retaining urine. Bladder scanned for 511. Straight cathed for 600 at 1930. Continue to monitor.

## 2019-05-26 LAB — PLATELET # BLD AUTO: 165 10E9/L (ref 150–450)

## 2019-05-26 PROCEDURE — 85049 AUTOMATED PLATELET COUNT: CPT | Performed by: COLON & RECTAL SURGERY

## 2019-05-26 PROCEDURE — 36415 COLL VENOUS BLD VENIPUNCTURE: CPT | Performed by: COLON & RECTAL SURGERY

## 2019-05-26 PROCEDURE — 25000128 H RX IP 250 OP 636: Performed by: COLON & RECTAL SURGERY

## 2019-05-26 PROCEDURE — 25000132 ZZH RX MED GY IP 250 OP 250 PS 637: Performed by: COLON & RECTAL SURGERY

## 2019-05-26 PROCEDURE — 12000000 ZZH R&B MED SURG/OB

## 2019-05-26 RX ADMIN — METHYLCELLULOSE 1000 MG: 500 TABLET ORAL at 09:31

## 2019-05-26 RX ADMIN — HYDROMORPHONE HYDROCHLORIDE 2 MG: 2 TABLET ORAL at 01:43

## 2019-05-26 RX ADMIN — DIAZEPAM 5 MG: 5 TABLET ORAL at 04:28

## 2019-05-26 RX ADMIN — HYDROMORPHONE HYDROCHLORIDE 2 MG: 2 TABLET ORAL at 09:31

## 2019-05-26 RX ADMIN — BETHANECHOL CHLORIDE 25 MG: 25 TABLET ORAL at 09:31

## 2019-05-26 RX ADMIN — BETHANECHOL CHLORIDE 25 MG: 25 TABLET ORAL at 13:20

## 2019-05-26 RX ADMIN — HEPARIN SODIUM 5000 UNITS: 5000 INJECTION, SOLUTION INTRAVENOUS; SUBCUTANEOUS at 17:28

## 2019-05-26 RX ADMIN — DIAZEPAM 5 MG: 5 TABLET ORAL at 22:23

## 2019-05-26 RX ADMIN — HEPARIN SODIUM 5000 UNITS: 5000 INJECTION, SOLUTION INTRAVENOUS; SUBCUTANEOUS at 01:38

## 2019-05-26 RX ADMIN — ACETAMINOPHEN 975 MG: 325 TABLET, FILM COATED ORAL at 04:28

## 2019-05-26 RX ADMIN — HYDROMORPHONE HYDROCHLORIDE 2 MG: 2 TABLET ORAL at 14:10

## 2019-05-26 RX ADMIN — BETHANECHOL CHLORIDE 25 MG: 25 TABLET ORAL at 22:23

## 2019-05-26 RX ADMIN — ACETAMINOPHEN 975 MG: 325 TABLET, FILM COATED ORAL at 10:56

## 2019-05-26 RX ADMIN — HYDROMORPHONE HYDROCHLORIDE 2 MG: 2 TABLET ORAL at 17:28

## 2019-05-26 RX ADMIN — HEPARIN SODIUM 5000 UNITS: 5000 INJECTION, SOLUTION INTRAVENOUS; SUBCUTANEOUS at 09:32

## 2019-05-26 RX ADMIN — BETHANECHOL CHLORIDE 25 MG: 25 TABLET ORAL at 17:34

## 2019-05-26 RX ADMIN — HYDROMORPHONE HYDROCHLORIDE 2 MG: 2 TABLET ORAL at 20:49

## 2019-05-26 RX ADMIN — HYDROMORPHONE HYDROCHLORIDE 2 MG: 2 TABLET ORAL at 06:19

## 2019-05-26 RX ADMIN — ACETAMINOPHEN 975 MG: 325 TABLET, FILM COATED ORAL at 17:34

## 2019-05-26 RX ADMIN — PRENATAL VIT W/ FE FUMARATE-FA TAB 27-0.8 MG 1 TABLET: 27-0.8 TAB at 09:31

## 2019-05-26 ASSESSMENT — ACTIVITIES OF DAILY LIVING (ADL)
ADLS_ACUITY_SCORE: 14

## 2019-05-26 NOTE — PROVIDER NOTIFICATION
Notified Dr Caceres regarding pt wondering if she can shower and if we can do enemas more than once a day.    Dr Caceres called back and okay to shower and do enemas twice a day.

## 2019-05-26 NOTE — PLAN OF CARE
Pt A&Ox4. VSS on RA. Ind in room. BS active. Penrose drain in place with small amount of serosanguinous drainage. Post void residuals ranging 150ml-300ml. Pt has harder time voiding with poor pain control. Dilaudid, Valium and tylenol given for pain. Possible discharge to home tomorrow.

## 2019-05-26 NOTE — PLAN OF CARE
A&Ox4. AVSS. IND. LS clear. BS active, passing flatus. Enema done, no BM. Able to do another enema this afternoon. Tolerating regular diet.  and 300 (having more pain-prn dilaudid given). Pain managed with PO dilaudid and tylenol. Penrose drain with scant drainage.

## 2019-05-26 NOTE — PROGRESS NOTES
[facility] Progress Note             Interval History:   Postop Day #: 3 s/p sphincteroplasty and repair of rectovaginal fistula.    SUBJECTIVE:  Doing well, minimal urinary retention, has not been straight cath'd. Tells me this am she hasn't really had a bowel movement although one was recorded in chart.  She is uneasy about discharge today if she hasn't had a BM.  Otherwise doing well                Medications:   I have reviewed this patient's current medications               Physical Exam:   Blood pressure 103/65, pulse 87, temperature 97.5  F (36.4  C), temperature source Oral, resp. rate 16, SpO2 95 %.      Intake/Output Summary (Last 24 hours) at 5/26/2019 0903  Last data filed at 5/26/2019 0600  Gross per 24 hour   Intake 360 ml   Output 1700 ml   Net -1340 ml     GEN:  Alert, oriented, NAD  ABD:  Soft, NT, ND  Rectal exam with incision with penrose drain in place with minimal serosang drainage, incision otherwise c/d/i         Data:        Lab Results   Component Value Date     05/24/2019    Lab Results   Component Value Date    CHLORIDE 104 05/24/2019    Lab Results   Component Value Date    BUN 7 05/24/2019      Lab Results   Component Value Date    POTASSIUM 3.5 05/24/2019    Lab Results   Component Value Date    CO2 30 05/24/2019    Lab Results   Component Value Date    CR 0.58 05/24/2019        Lab Results   Component Value Date    HGB 12.4 05/24/2019     Lab Results   Component Value Date     05/26/2019     05/23/2019     No results found for: WBC         Assessment and Plan:   POD#3 s/p sphincteroplasty and repair of rectovaginal fistula.  - tap water enemas via red rubber catheter today  (200 ml tap water)  - Cont current diet  - if needs another straight cath will need reyes replaced  - possible discharge to home today if able to do enemas, and bowel movement.      Zara León MD  [facility]  Office Phone # [phone]  Pager:  [phone]

## 2019-05-26 NOTE — PLAN OF CARE
A&Ox4. VSS on RA. Up independently in room. Tolerating regular diet. Voiding then PVR, voided 250mls then PVR around 100mls, attempted to void a second time and was unable to, bladder scanned for 370mls, pt requested to ambulate halls and let pain medication work before straight cath. Pain managed with PO dilaudid, tylenol and valium. Penrose drain with scant drainage. Tolerating diet. Slept between cares. Continue to monitor.

## 2019-05-27 VITALS
OXYGEN SATURATION: 97 % | RESPIRATION RATE: 17 BRPM | TEMPERATURE: 96.7 F | DIASTOLIC BLOOD PRESSURE: 56 MMHG | HEART RATE: 78 BPM | SYSTOLIC BLOOD PRESSURE: 108 MMHG

## 2019-05-27 PROCEDURE — 25000132 ZZH RX MED GY IP 250 OP 250 PS 637: Performed by: COLON & RECTAL SURGERY

## 2019-05-27 PROCEDURE — 25000128 H RX IP 250 OP 636: Performed by: COLON & RECTAL SURGERY

## 2019-05-27 RX ADMIN — HYDROMORPHONE HYDROCHLORIDE 4 MG: 2 TABLET ORAL at 00:28

## 2019-05-27 RX ADMIN — BETHANECHOL CHLORIDE 25 MG: 25 TABLET ORAL at 15:08

## 2019-05-27 RX ADMIN — HYDROMORPHONE HYDROCHLORIDE 2 MG: 2 TABLET ORAL at 05:55

## 2019-05-27 RX ADMIN — PRENATAL VIT W/ FE FUMARATE-FA TAB 27-0.8 MG 1 TABLET: 27-0.8 TAB at 08:21

## 2019-05-27 RX ADMIN — HEPARIN SODIUM 5000 UNITS: 5000 INJECTION, SOLUTION INTRAVENOUS; SUBCUTANEOUS at 08:30

## 2019-05-27 RX ADMIN — HYDROMORPHONE HYDROCHLORIDE 2 MG: 2 TABLET ORAL at 09:47

## 2019-05-27 RX ADMIN — BETHANECHOL CHLORIDE 25 MG: 25 TABLET ORAL at 08:21

## 2019-05-27 RX ADMIN — METHYLCELLULOSE 1000 MG: 500 TABLET ORAL at 08:21

## 2019-05-27 RX ADMIN — HEPARIN SODIUM 5000 UNITS: 5000 INJECTION, SOLUTION INTRAVENOUS; SUBCUTANEOUS at 00:30

## 2019-05-27 RX ADMIN — ACETAMINOPHEN 975 MG: 325 TABLET, FILM COATED ORAL at 02:33

## 2019-05-27 RX ADMIN — HYDROMORPHONE HYDROCHLORIDE 2 MG: 2 TABLET ORAL at 14:34

## 2019-05-27 RX ADMIN — ACETAMINOPHEN 975 MG: 325 TABLET, FILM COATED ORAL at 08:27

## 2019-05-27 RX ADMIN — DIAZEPAM 5 MG: 5 TABLET ORAL at 05:56

## 2019-05-27 RX ADMIN — HYDROMORPHONE HYDROCHLORIDE 2 MG: 2 TABLET ORAL at 10:28

## 2019-05-27 ASSESSMENT — ACTIVITIES OF DAILY LIVING (ADL)
ADLS_ACUITY_SCORE: 12
ADLS_ACUITY_SCORE: 12
ADLS_ACUITY_SCORE: 14
ADLS_ACUITY_SCORE: 12

## 2019-05-27 NOTE — PLAN OF CARE
A&Ox4. VSS on RA. Tolerating regular diet. Up independently in room. Penrose drain with small serosanguinous drainage. Incision WNL. Pain managed with tylenol, valium and dilaudid. Voided x2, PVR. Bladder scanned for 430mls, after pain medication and walking able to void 250 mls and bladder scanned 100mls. Slept between cares.

## 2019-05-27 NOTE — DISCHARGE INSTRUCTIONS
Discharge Instructions following Abdominal Surgery  [facility]      Bowel Function  After removal of a portion of the intestines, it is normal for bowel movements to be erratic.  They are often looser and more frequent.  This condition generally resolves within a few weeks or it can be controlled with diet or medication.  Diet  In general, you may return to a well-balanced diet upon discharge.  Your doctor will let you know when to add extra fiber to your diet.  Be sure to drink plenty of fluids.  Incision  You should expect some discomfort in the area of your incision, particularly as you increase your activity.  If you notice an area of increasing redness or new drainage, please call your doctor.  You may shower as desired but refrain from tub baths or swimming until the incisions are fully healed.  Activity  Gradually increase your activity each day.  There are generally no restrictions on walking, climbing stairs, or riding in a car.  You can usually drive a car 7-10 days after your leave the hospital.  Do not drive while taking narcotic pain medications.  Ask your doctor regarding resumption of physical sexual activity; in most cases, you can resume sex after a few weeks.  Your physician will advise you about when to return to work.  It is preferable to have somebody stay with you at home until you are fully healed and able to resume a normal level of activity   Medications  You will be discharged with a prescription pain medication and any medications you were taking prior to surgery.  Do not drive while taking narcotic pain medications.  If you need additional medications or a refill, you must call your doctor during normal business hours.  It is the policy of [facility] to not refill pain medication after hours or on weekends because your chart is not available.  Follow-up  Typically, you will be asked to schedule a follow-up office visit 1  to 4 weeks after surgery.  If you have any questions related to follow-up, medications, or your condition, please call the office.      Call your surgeon if you have these signs or symptoms:  ([phone])    Problem with the incision, including increasing pain, swelling, redness, or drainage    Increasing abdominal pain    Uncontrolled nausea or vomiting    Fever or chills    Constipation  (no bowel movement for 3 days)    Diarrhea (more than 3 watery stools within 24 hours)    Bleeding from the rectum, wound, or stoma    Any questions or concerns

## 2019-05-27 NOTE — PROGRESS NOTES
Colon & Rectal Surgery Progress Note             Interval History:   Postop Day #: 4 s/p sphincteroplasty and repair of rectovaginal fistula.    SUBJECTIVE:  Several small BMs yesterday. Tolerating enemas per [facility].  Ongoing problems with urinary retention.  PVRs as high as 350.  Pelvic pressure with retention and BMs.  Feels ready to go home except for urinary retention.                Medications:   I have reviewed this patient's current medications               Physical Exam:   Blood pressure 107/68, pulse 87, temperature 98.2  F (36.8  C), temperature source Oral, resp. rate 17, SpO2 96 %.      Intake/Output Summary (Last 24 hours) at 5/26/2019 0903  Last data filed at 5/26/2019 0600  Gross per 24 hour   Intake 360 ml   Output 1700 ml   Net -1340 ml     GEN:  Alert, oriented, NAD  ABD:  Soft, NT, ND  Rectal exam with incision with penrose drain in place with minimal serosang drainage, incision otherwise c/d/i         Data:        No new labs           Assessment and Plan:   POD#4 s/p sphincteroplasty and repair of rectovaginal fistula.    - tap water enemas via red rubber catheter today  (200 ml tap water)  - Cont current diet  - place reyes today  - discharge to home today with reyes in place.  Patient will need to follow-up in clinic Thursday or Friday for trial of void.     Zara León MD  [facility]  Office Phone # [phone]  Pager: [phone]

## 2019-05-27 NOTE — PLAN OF CARE
A&O x4. VSS on room air. CMS intact. Lungs clear. BS+, BM+, frequent, flatus+. Incision PAM, penrose drain in place w/scant serosanguinous drainage. Tolerating regular diet. Denies N/V. Urinary retention, reyes place at 1200 as patient was not able to fully empty bladder. PO Dilaudid and Tylenol for pain. Up ad carin. No IV access.    Discharge instructions, medications and follow up appointments were discussed with the patient and her . Leg bag, reyes cares, and enema procedure were discussed and supplies were given to the patient. Belongings were returned, and patient was discharged home.

## 2019-05-27 NOTE — PLAN OF CARE
Tolerating regular diet. Up ambulating halls independently. Voiding adequate amount on own w/  and 122. Pt able to self administer enema with small coffee ground-like output. Penrose drain in place w/ small serosanguinous output. Incision WNL. Perineum pain controlled with oral Dilaudid 2 mg. Plan for discharge home w/  tomorrow.

## 2019-05-28 ENCOUNTER — HOSPITAL ENCOUNTER (EMERGENCY)
Facility: CLINIC | Age: 39
Discharge: HOME | End: 2019-05-28
Attending: EMERGENCY MEDICINE | Admitting: EMERGENCY MEDICINE
Payer: COMMERCIAL

## 2019-05-28 VITALS
TEMPERATURE: 98.6 F | SYSTOLIC BLOOD PRESSURE: 105 MMHG | RESPIRATION RATE: 16 BRPM | HEIGHT: 63 IN | DIASTOLIC BLOOD PRESSURE: 69 MMHG | HEART RATE: 87 BPM | OXYGEN SATURATION: 99 % | BODY MASS INDEX: 19.14 KG/M2 | WEIGHT: 108 LBS

## 2019-05-28 DIAGNOSIS — R50.82 POSTSURGICAL FEVER: ICD-10-CM

## 2019-05-28 LAB
ALBUMIN SERPL-MCNC: 3.5 G/DL (ref 3.4–5)
ALBUMIN UR-MCNC: NEGATIVE MG/DL
ALP SERPL-CCNC: 76 U/L (ref 40–150)
ALT SERPL W P-5'-P-CCNC: 26 U/L (ref 0–50)
ANION GAP SERPL CALCULATED.3IONS-SCNC: 6 MMOL/L (ref 3–14)
APPEARANCE UR: CLEAR
AST SERPL W P-5'-P-CCNC: 23 U/L (ref 0–45)
BASOPHILS # BLD AUTO: 0 10E9/L (ref 0–0.2)
BASOPHILS NFR BLD AUTO: 0.6 %
BILIRUB SERPL-MCNC: 0.5 MG/DL (ref 0.2–1.3)
BILIRUB UR QL STRIP: NEGATIVE
BUN SERPL-MCNC: 13 MG/DL (ref 7–30)
CALCIUM SERPL-MCNC: 8.7 MG/DL (ref 8.5–10.1)
CHLORIDE SERPL-SCNC: 101 MMOL/L (ref 94–109)
CO2 SERPL-SCNC: 29 MMOL/L (ref 20–32)
COLOR UR AUTO: ABNORMAL
CREAT SERPL-MCNC: 0.6 MG/DL (ref 0.52–1.04)
DIFFERENTIAL METHOD BLD: ABNORMAL
EOSINOPHIL # BLD AUTO: 0.1 10E9/L (ref 0–0.7)
EOSINOPHIL NFR BLD AUTO: 1.1 %
ERYTHROCYTE [DISTWIDTH] IN BLOOD BY AUTOMATED COUNT: 12.3 % (ref 10–15)
GFR SERPL CREATININE-BSD FRML MDRD: >90 ML/MIN/{1.73_M2}
GLUCOSE SERPL-MCNC: 101 MG/DL (ref 70–99)
GLUCOSE UR STRIP-MCNC: NEGATIVE MG/DL
HCT VFR BLD AUTO: 36.8 % (ref 35–47)
HGB BLD-MCNC: 12.5 G/DL (ref 11.7–15.7)
HGB UR QL STRIP: NEGATIVE
IMM GRANULOCYTES # BLD: 0 10E9/L (ref 0–0.4)
IMM GRANULOCYTES NFR BLD: 0.2 %
KETONES UR STRIP-MCNC: NEGATIVE MG/DL
LEUKOCYTE ESTERASE UR QL STRIP: NEGATIVE
LYMPHOCYTES # BLD AUTO: 0.7 10E9/L (ref 0.8–5.3)
LYMPHOCYTES NFR BLD AUTO: 11.6 %
MCH RBC QN AUTO: 28.9 PG (ref 26.5–33)
MCHC RBC AUTO-ENTMCNC: 34 G/DL (ref 31.5–36.5)
MCV RBC AUTO: 85 FL (ref 78–100)
MONOCYTES # BLD AUTO: 0.4 10E9/L (ref 0–1.3)
MONOCYTES NFR BLD AUTO: 5.7 %
MUCOUS THREADS #/AREA URNS LPF: PRESENT /LPF
NEUTROPHILS # BLD AUTO: 5.1 10E9/L (ref 1.6–8.3)
NEUTROPHILS NFR BLD AUTO: 80.8 %
NITRATE UR QL: NEGATIVE
NRBC # BLD AUTO: 0 10*3/UL
NRBC BLD AUTO-RTO: 0 /100
PH UR STRIP: 6 PH (ref 5–7)
PLATELET # BLD AUTO: 155 10E9/L (ref 150–450)
POTASSIUM SERPL-SCNC: 3.8 MMOL/L (ref 3.4–5.3)
PROT SERPL-MCNC: 7.4 G/DL (ref 6.8–8.8)
RBC # BLD AUTO: 4.33 10E12/L (ref 3.8–5.2)
RBC #/AREA URNS AUTO: 2 /HPF (ref 0–2)
SODIUM SERPL-SCNC: 136 MMOL/L (ref 133–144)
SOURCE: ABNORMAL
SP GR UR STRIP: 1.01 (ref 1–1.03)
UROBILINOGEN UR STRIP-MCNC: NORMAL MG/DL (ref 0–2)
WBC # BLD AUTO: 6.3 10E9/L (ref 4–11)
WBC #/AREA URNS AUTO: 1 /HPF (ref 0–5)

## 2019-05-28 PROCEDURE — 81001 URINALYSIS AUTO W/SCOPE: CPT | Performed by: EMERGENCY MEDICINE

## 2019-05-28 PROCEDURE — 85025 COMPLETE CBC W/AUTO DIFF WBC: CPT | Performed by: EMERGENCY MEDICINE

## 2019-05-28 PROCEDURE — 80053 COMPREHEN METABOLIC PANEL: CPT | Performed by: EMERGENCY MEDICINE

## 2019-05-28 PROCEDURE — 99284 EMERGENCY DEPT VISIT MOD MDM: CPT | Mod: 25

## 2019-05-28 PROCEDURE — 96360 HYDRATION IV INFUSION INIT: CPT

## 2019-05-28 PROCEDURE — 25000128 H RX IP 250 OP 636: Performed by: EMERGENCY MEDICINE

## 2019-05-28 RX ADMIN — SODIUM CHLORIDE 1000 ML: 9 INJECTION, SOLUTION INTRAVENOUS at 15:39

## 2019-05-28 ASSESSMENT — ENCOUNTER SYMPTOMS
COUGH: 0
SHORTNESS OF BREATH: 0
CONSTIPATION: 0
FEVER: 1
COLOR CHANGE: 0

## 2019-05-28 ASSESSMENT — MIFFLIN-ST. JEOR: SCORE: 1139.01

## 2019-05-28 NOTE — ED AVS SNAPSHOT
Emergency Department  [address] [place] [address]  Phone:  [phone]  Fax:  [phone]                                    Renuka Rojas   MRN: [id number]    Department:   Emergency Department   Date of Visit:  5/28/2019           After Visit Summary Signature Page    I have received my discharge instructions, and my questions have been answered. I have discussed any challenges I see with this plan with the nurse or doctor.    ..........................................................................................................................................  Patient/Patient Representative Signature      ..........................................................................................................................................  Patient Representative Print Name and Relationship to Patient    ..................................................               ................................................  Date                                   Time    ..........................................................................................................................................  Reviewed by Signature/Title    ...................................................              ..............................................  Date                                               Time          22EPIC Rev 08/18

## 2019-05-28 NOTE — CONSULTS
[facility]  Colon and Rectal Surgery Consult Note  Name: Renuka Rojas    MRN: [id number]  YOB: 1980    Age: 38 year old  Date of admission: 5/28/2019  Primary care provider: [facility]     Requesting Physician:  n/a   Reason for consult:  S/p shincteroplasty and repair of rectovaginal fistula           History of Present Illness:   Renuka Rojas is a 38 year old female seen in the ED who presents with fever. Renuka is POD#5 sphincteroplasty and repair of rectovaginal fistula. She called into clinic with symptoms of chills, fevers and pain. She then called back to clinic with a fever of 101.7 and stated she was going to the ED. I saw her in the ED and at that time, her temperature was 98.6. Dr. Morris had started an ED work-up including CBC, CMP, and UA.     Renuka reports the chills started at noon today, and 30 minutes later she had a fever of 101.7. She reports the pain being difficult to manage. She took 4mg of Dilaudid to sleep through the night last night, however she says her pain is better today. She has 1-2 BMs/day and uses enemas as needed for incomplete evacuation. She reports drainage from her wound, she wears a pad and changes it once a day, more for comfort. She is eating and drinking well and denies nausea and vomiting. She still has a Basilio in place. She is doing sitz baths which help with the wound pain.                 Past Medical History:     Past Medical History:   Diagnosis Date     Anxiety      Depression      Rectovaginal fistula              Past Surgical History:     Past Surgical History:   Procedure Laterality Date     ENT SURGERY      [place] teeth extraction     GYN SURGERY      D&C     SPHINCTEROPLASTY RECTUM N/A 5/23/2019    Procedure: SPHINCTEROPLASTY, RECTUM, REPAIR OF RECTAL VAGINAL FISTULA;  Surgeon: Elza Hills MD;  Location:  OR               Social History:     Social History     Tobacco Use      "Smoking status: Former Smoker     Last attempt to quit: 2011     Years since quittin.4     Smokeless tobacco: Never Used     Tobacco comment: occassional   Substance Use Topics     Alcohol use: Not on file             Family History:   No family history on file.          Allergies:     Allergies   Allergen Reactions     Sertraline GI Disturbance             Medications:             Review of Systems:   A comprehensive greater than 10 system review of systems was carried out.  Pertinent positives and negatives are noted above.  Otherwise negative for contributory info.            Physical Exam:     Blood pressure 106/69, pulse 78, temperature 98.6  F (37  C), temperature source Oral, resp. rate 16, height 1.6 m (5' 3\"), weight 49 kg (108 lb), SpO2 100 %.  No intake or output data in the 24 hours ending 19 1647  Exam:  General - Awake alert and oriented, appears stated age  Pulm - Non-labored breathing with normal respiratory effort  CVS - reg rate and rhythm, no peripheral edema  Abd -  No guarding, rigidity or peritoneal signs.   External Rectal exam was completed. Anus with intact skin. Wound extending from anus to vagina with brown mucus discharge. Drain in place. Spotting of discharge on pad. Tender to touch. No erythema or swelling of wound.   Neuro - CN II-XII grossly intact  Musculoskeletal - extremities with no clubbing, cyanosis or edema; able to ambulate  Psych - responsive, alert, cooperative; oriented x3; appropriate mood and affect  External/skin - inspection reveals no rashes, lesions or ulcers, normal coloring             Data Reviewed:   No results found for this or any previous visit (from the past 24 hour(s)).    Recent Labs   Lab 19  1537 19  0754 19  0748 19  1255   WBC 6.3  --   --   --    HGB 12.5  --  12.4  --    HCT 36.8  --   --   --    MCV 85  --   --   --     165  --  189     Recent Labs   Lab 19  1537 19  0748    140 "   POTASSIUM 3.8 3.5   CHLORIDE 101 104   CO2 29 30   ANIONGAP 6 6   * 121*   BUN 13 7   CR 0.60 0.58   GFRESTIMATED >90 >90   GFRESTBLACK >90 >90   MARTA 8.7 8.6   PROTTOTAL 7.4  --    ALBUMIN 3.5  --    BILITOTAL 0.5  --    ALKPHOS 76  --    AST 23  --    ALT 26  --      Recent Labs   Lab 05/28/19  1601   COLOR Light Yellow   APPEARANCE Clear   URINEGLC Negative   URINEBILI Negative   URINEKETONE Negative   SG 1.011   UBLD Negative   URINEPH 6.0   PROTEIN Negative   NITRITE Negative   LEUKEST Negative   RBCU 2   WBCU 1         Assessment and Plan:   Renuka Rojas is a 38 year old female who presented to the ED with fevers and pain.    Plan:  1. Discharge home from ED  2. Surgery: No indication for urgent surgery at this time.  3. Diet: regular  4. IV Fluids: n/a  5. Antibiotics: no indication  6. Medications: resume regular medications  7. Basilio in place, f/u scheduled in clinic 5/30 with Dr. Parker  8. Drain removed without pain. She may notice more discharge since drain is gone.  9. Labs:   - Reviewed: Yes  10. Continue sitz baths  11. This plan has been discussed with Dr. Hills. Patient will be rescheduled for POV with Dr. Hills on 6/12.    Patient specific identified risk factors considered as part of today s evaluation include: recent discharge, POD#5    Additional history obtained from previous hospitalization and ED notes/doctor.  Time spent on consultation: 20 minutes, greater than 50% spent on counseling and/or coordination of care.      Ronel Hays PA-C  [facility]  [phone]

## 2019-05-28 NOTE — ED PROVIDER NOTES
History     Chief Complaint:  Post-op Problem    HPI   Renuka Rojas is a 38 year old female s/p anal sphincteroplasty with repair of rectovaginal fistula who presents with her mother for evaluation of some chills. The patient reports that she was discharged from the hospital but then today she started having chills at around 1200. She reports that her temperature was initially 99.6 and then a few minutes later had risen up to 101.7 Her procedure was done by [facility]. The patient reports that he has since been passing stools. She has also been doing rectal water enemas. She does however feel an urge to urinate all the time; has Basilio catheter in place secondary to urinary retention postsurgically. She is not on any antibiotics. She denies any rash, skin changes, cough, or shortness of breath. She has a follow up appointment tomorrow which she plans to keep. Today, she took  600 mg ibuprofen and 1000 mg Tylenol at 1200 for pain relief.     Allergies:  Sertraline      Medications:    Calcium PO   Hydromorphone (dilaudid) 2 mg tablet  Methylcellulose (Citrucel) 500 mg tabs tablet  Ondansetron (Zofran) 4 mg tablet  Prenatal vit-fe fumarate-fa (prenatal multivitamin w/iron) 27-0.8 mg tablet  Probiotic product (probiotic PO )     Past Medical History:    Anxiety   Depression   Rectovaginal fistula     Past Surgical History:    GYN surgery;   [place] teeth extraction.   Sphincteroplasty rectum (5/23/2019).     Family History:    No family history on file.    Social History:  Ms. Rojas reports that she has 2 children at home aged 3 and 1. She reports that she quit smoking about 7 years ago. She has never used smokeless tobacco. She reports that she does not use drugs.   Marital Status:   [2]      Review of Systems   Constitutional: Positive for fever.   Respiratory: Negative for cough and shortness of breath.    Cardiovascular: Negative for chest pain and leg swelling.   Gastrointestinal:  "Negative for constipation.   Skin: Negative.  Negative for color change, pallor and rash.   All other systems reviewed and are negative.      Physical Exam     Vital signs  Patient Vitals for the past 24 hrs:   BP Temp Temp src Pulse Heart Rate Resp SpO2 Height Weight   05/28/19 1645 -- -- -- -- -- -- 99 % -- --   05/28/19 1630 105/69 -- -- 87 -- -- 98 % -- --   05/28/19 1600 106/69 -- -- 78 -- -- 100 % -- --   05/28/19 1430 116/72 98.6  F (37  C) Oral -- 107 16 98 % 1.6 m (5' 3\") 49 kg (108 lb)          Physical Exam  General: Alert and cooperative with exam. Patient in mild distress. Normal mentation.  Head:  Scalp is NC/AT  Eyes:  No scleral icterus, PERRL  ENT:  The external nose and ears are normal. The oropharynx is normal and without erythema; mucus membranes are moist. Uvula midline, no evidence of deep space infection.  Neck:  Normal range of motion without rigidity.  CV:  Regular rate and rhythm    No pathologic murmur   Resp:  Breath sounds are clear bilaterally    Non-labored, no retractions or accessory muscle use  GI:  Abdomen is soft, no distension, no tenderness. No peritoneal signs  :   Basilio catheter in place. Rectal - vaginal drain present without significant evidence of infection.   MS:  No lower extremity edema   Skin:  Warm and dry, No rash or lesions noted.  Neuro: Oriented x 3. No gross motor deficits.     Emergency Department Course   Laboratory:  UA: Mucous present otherwise normal     CBC: Lymphocytes 0.7, otherwise normal   CMP: Glucose 101, otherwise normal     Interventions:  1539: NS 1L IV Bolus     Emergency Department Course:  Past medical records, nursing notes, and vitals reviewed.  1522: I performed an exam of the patient and obtained history, as documented above.       1618: I rechecked the patient.     She was also evaluated by Ronel WOLFF from Dr. Hills's team    1648 :Rechecked the patient, findings and plan explained to the patient. Patient discharged home, status improved, " with instructions regarding supportive care, medications, and reasons to return as well as the importance of close follow-up was reviewed.    Impression & Plan    Medical Decision Making:  Patient is a 38-year-old female status post repair of a vaginal rectal fistula who presents with reported postoperative fever; currently afebrile though took Tylenol/ibuprofen prior to ED arrival.  Patient's medical history and records were reviewed.  On evaluation patient is well-appearing with normal vital signs with exception of very mild tachycardia which resolved prior to any significant fluid administration.  Exam demonstrated drain in place with normal postoperative changes; examined with sahara Rodney PA for Dr. Hills in the ED who removed the drain in the ED.  UA without evidence of infection; Basilio to remain in place until clinic visit on Thursday (2 days from now).  Blood work is reassuring.  At this time no source for patient's reported fever could be determined.  Recommended continued supportive care and close follow-up with colorectal surgery as scheduled.  Return precautions discussed.  Patient discharged home.  Patient has understanding and agreement with plan.    Diagnosis:    ICD-10-CM    1. Postsurgical fever R50.82        Disposition:  discharged to home    Malini LOVETT am serving as a scribe at 4:18 PM on 5/28/2019 to document services personally performed by Klaus Morris DO based on my observations and the provider's statements to me.     EMERGENCY DEPARTMENT       Klaus Morris DO  05/28/19 2609

## 2019-05-31 NOTE — DISCHARGE SUMMARY
[facility] Discharge Summary      Renuka Rojas MRN# [id number]   Age: 38 year old YOB: 1980     Date of Admission:  5/23/2019  Date of Discharge::  5/27/2019  3:12 PM  Admitting Physician:  Elza Hills MD  Discharge Physician:  Elza Hills MD     PCP:  [facility]    Disposition: Patient discharged from [facility] to home in stable condition.        Primary Diagnosis:      Rectovaginal fistula with sphincter injury         Discharge Medications:   Discharge Medication List as of 5/27/2019  2:44 PM      START taking these medications    Details   HYDROmorphone (DILAUDID) 2 MG tablet Take 1-2 tablets (2-4 mg) by mouth every 6 hours as needed for moderate to severe pain, Disp-20 tablet, R-0, Local Print      methylcellulose (CITRUCEL) 500 MG TABS tablet Take 2 tablets (1,000 mg) by mouth daily, Disp-60 tablet, R-0, Local Print         CONTINUE these medications which have NOT CHANGED    Details   CALCIUM PO Take 1 tablet by mouth daily, Historical      ondansetron (ZOFRAN) 4 MG tablet Take 4 mg by mouth every 8 hours as needed for nausea , Historical      Prenatal Vit-Fe Fumarate-FA (PRENATAL MULTIVITAMIN W/IRON) 27-0.8 MG tablet Take 1 tablet by mouth daily, Historical      Probiotic Product (PROBIOTIC PO) Take 1 tablet by mouth daily, Historical                    Follow Up, Special Instructions:   Discharge diet: Regular   Discharge activity: Activity as tolerated   Discharge follow-up: Follow up with Dr. Parker on 5/30 and Dr Hills in 2 weeks.   Wound care: Ice to area for comfort  Keep wound clean and dry  May get incision wet in shower but do not soak or scrub              Procedures:   Procedure(s): Anal sphincteroplasty with repair of rectovaginal fistula.          No procedures performed during this admission            Consultations:   None          Brief Hospital Summary:   Patient is a 38 year old female who underwent  sphincteroplasty on 5/23 by Dr. Hills.   There were no immediate complications during this procedure.    Please refer to the full operative summary for details.  The patient's hospital course was unremarkable aside for some minor difficulty with urinary retention, she will be seen 5/30 for reyes removal.  Pain was controlled on oral pain regimen.  She was tolerating a low fiber diet.  Bowel function had returned prior to discharge.  She recovered as anticipated and experienced no post-operative complications.         Attestation:  I have reviewed today's vital signs, notes, medications, labs and imaging.    Merary Elkins PA-C  [facility]          ADDENDUM:  Length of stay: 4 days  Indicate Y or N for the following:  UTI  No  C diff  No  PNA  No  SSI No  DVT No  PE  No  CVA No  MI No  Enterocutaneous fistula  No  Peripheral nerve injury  No  Abscess (not adjacent to anastomosis)  No  Leak No    Treated with:   Antibiotics N/A   Drain  N/A   Reoperation  N/A  Death within 30 days No  Reintubation  No  Reoperation  No

## 2020-03-11 ENCOUNTER — HEALTH MAINTENANCE LETTER (OUTPATIENT)
Age: 40
End: 2020-03-11

## 2021-01-03 ENCOUNTER — HEALTH MAINTENANCE LETTER (OUTPATIENT)
Age: 41
End: 2021-01-03

## 2021-04-12 ENCOUNTER — ANCILLARY PROCEDURE (OUTPATIENT)
Dept: MAMMOGRAPHY | Facility: CLINIC | Age: 41
End: 2021-04-12
Attending: OBSTETRICS & GYNECOLOGY
Payer: COMMERCIAL

## 2021-04-12 DIAGNOSIS — Z12.31 VISIT FOR SCREENING MAMMOGRAM: ICD-10-CM

## 2021-04-12 PROCEDURE — 77063 BREAST TOMOSYNTHESIS BI: CPT | Performed by: RADIOLOGY

## 2021-04-12 PROCEDURE — 77067 SCR MAMMO BI INCL CAD: CPT | Performed by: RADIOLOGY

## 2021-04-25 ENCOUNTER — HEALTH MAINTENANCE LETTER (OUTPATIENT)
Age: 41
End: 2021-04-25

## 2021-10-10 ENCOUNTER — HEALTH MAINTENANCE LETTER (OUTPATIENT)
Age: 41
End: 2021-10-10

## 2022-05-21 ENCOUNTER — HEALTH MAINTENANCE LETTER (OUTPATIENT)
Age: 42
End: 2022-05-21

## 2022-07-01 ENCOUNTER — ANCILLARY PROCEDURE (OUTPATIENT)
Dept: MAMMOGRAPHY | Facility: CLINIC | Age: 42
End: 2022-07-01
Attending: PHYSICIAN ASSISTANT
Payer: COMMERCIAL

## 2022-07-01 DIAGNOSIS — Z12.31 VISIT FOR SCREENING MAMMOGRAM: ICD-10-CM

## 2022-07-01 PROCEDURE — 77067 SCR MAMMO BI INCL CAD: CPT | Mod: GC | Performed by: STUDENT IN AN ORGANIZED HEALTH CARE EDUCATION/TRAINING PROGRAM

## 2022-07-01 PROCEDURE — 77063 BREAST TOMOSYNTHESIS BI: CPT | Mod: GC | Performed by: STUDENT IN AN ORGANIZED HEALTH CARE EDUCATION/TRAINING PROGRAM

## 2022-07-19 ENCOUNTER — ANCILLARY PROCEDURE (OUTPATIENT)
Dept: MAMMOGRAPHY | Facility: CLINIC | Age: 42
End: 2022-07-19
Attending: PHYSICIAN ASSISTANT
Payer: COMMERCIAL

## 2022-07-19 DIAGNOSIS — R92.8 ABNORMAL MAMMOGRAM: ICD-10-CM

## 2022-07-19 PROCEDURE — 77065 DX MAMMO INCL CAD UNI: CPT | Performed by: RADIOLOGY

## 2022-07-19 PROCEDURE — G0279 TOMOSYNTHESIS, MAMMO: HCPCS | Performed by: RADIOLOGY

## 2022-09-18 ENCOUNTER — HEALTH MAINTENANCE LETTER (OUTPATIENT)
Age: 42
End: 2022-09-18

## 2023-06-04 ENCOUNTER — HEALTH MAINTENANCE LETTER (OUTPATIENT)
Age: 43
End: 2023-06-04

## 2023-08-09 ENCOUNTER — ANCILLARY PROCEDURE (OUTPATIENT)
Dept: MAMMOGRAPHY | Facility: CLINIC | Age: 43
End: 2023-08-09
Attending: PHYSICIAN ASSISTANT
Payer: COMMERCIAL

## 2023-08-09 DIAGNOSIS — Z12.31 VISIT FOR SCREENING MAMMOGRAM: ICD-10-CM

## 2023-08-09 PROCEDURE — 77067 SCR MAMMO BI INCL CAD: CPT | Mod: GC

## 2023-08-09 PROCEDURE — 77063 BREAST TOMOSYNTHESIS BI: CPT | Mod: GC

## 2023-08-14 NOTE — ANESTHESIA POSTPROCEDURE EVALUATION
Patient: Renuka Rojas    Procedure(s):  SPHINCTEROPLASTY, RECTUM, REPAIR OF RECTAL VAGINAL FISTULA    Diagnosis:FISTULA OF VAGINA TO LARGE INTESTINE  Diagnosis Additional Information: No value filed.    Anesthesia Type:  General, ETT    Note:  Anesthesia Post Evaluation    Patient location during evaluation: PACU  Patient participation: Able to fully participate in evaluation  Level of consciousness: awake, awake and alert and responsive to verbal stimuli  Pain management: adequate  Airway patency: patent  Cardiovascular status: acceptable  Respiratory status: acceptable  Hydration status: acceptable  PONV: none     Anesthetic complications: None          Last vitals:  Vitals:    05/23/19 1319 05/23/19 1334 05/23/19 1345   BP:   97/57   Pulse:   96   Resp: 13 15 12   Temp:      SpO2: 94% 93% 94%         Electronically Signed By: Precious Lindo  May 23, 2019  2:19 PM   Principal Discharge DX:	Alcohol abuse   1

## 2023-09-16 ENCOUNTER — HEALTH MAINTENANCE LETTER (OUTPATIENT)
Age: 43
End: 2023-09-16

## 2024-02-25 ENCOUNTER — HEALTH MAINTENANCE LETTER (OUTPATIENT)
Age: 44
End: 2024-02-25

## 2024-11-04 ENCOUNTER — ANCILLARY PROCEDURE (OUTPATIENT)
Dept: MAMMOGRAPHY | Facility: CLINIC | Age: 44
End: 2024-11-04
Payer: COMMERCIAL

## 2024-11-04 DIAGNOSIS — Z12.31 VISIT FOR SCREENING MAMMOGRAM: ICD-10-CM

## 2024-11-04 PROCEDURE — 77063 BREAST TOMOSYNTHESIS BI: CPT | Mod: GC

## 2024-11-04 PROCEDURE — 77067 SCR MAMMO BI INCL CAD: CPT | Mod: GC

## 2024-11-09 ENCOUNTER — HEALTH MAINTENANCE LETTER (OUTPATIENT)
Age: 44
End: 2024-11-09

## (undated) DEVICE — ESU GROUND PAD ADULT W/CORD E7507

## (undated) DEVICE — CATH TRAY FOLEY SURESTEP 16FR WDRAIN BAG STLK LATEX A300316A

## (undated) DEVICE — SOL WATER IRRIG 1000ML BOTTLE 2F7114

## (undated) DEVICE — ESU ELEC NDL 1" E1552

## (undated) DEVICE — DRSG KERLIX FLUFFS X5

## (undated) DEVICE — SU MONOCRYL 4-0 PS-2 18" UND Y496G

## (undated) DEVICE — DRAIN PENROSE 0.25"X18" LATEX FREE GR201

## (undated) DEVICE — SU VICRYL 3-0 SH 27" J316H

## (undated) DEVICE — PANTIES MESH LG/XLG 2PK 706M2

## (undated) DEVICE — GLOVE PROTEXIS W/NEU-THERA 6.0  2D73TE60

## (undated) DEVICE — SUCTION CANISTER MEDIVAC LINER 3000ML W/LID 65651-530

## (undated) DEVICE — SPONGE RAY-TEC 3X3" 30-094

## (undated) DEVICE — PACK MINOR SBA15MIFSE

## (undated) DEVICE — DRAPE MINOR PROCEDURE LAP 29496

## (undated) DEVICE — NDL 27GA 1.25" 305136

## (undated) DEVICE — ESU PENCIL W/SMOKE EVAC NEPTUNE STRYKER 0703-046-000

## (undated) DEVICE — SYR 10ML FINGER CONTROL W/O NDL 309695

## (undated) DEVICE — SU PDS II 2-0 CT-2 27"  Z333H

## (undated) DEVICE — SYR EAR BULB 3OZ 0035830

## (undated) DEVICE — ESU CLEANER TIP 31142717

## (undated) DEVICE — SPONGE BALL KERLIX ROUND XL W/O STRING LATEX 4935

## (undated) DEVICE — LINEN TOWEL PACK X5 5464

## (undated) DEVICE — JELLY LUBRICATING SURGILUBE 4OZ TUBE

## (undated) DEVICE — GLOVE PROTEXIS MICRO 6.5  2D73PM65

## (undated) DEVICE — SU VICRYL 2-0 SH 27" J317H

## (undated) DEVICE — SPONGE PACK VAGINAL 2"X9

## (undated) DEVICE — SOL NACL 0.9% IRRIG 1000ML BOTTLE 2F7124

## (undated) RX ORDER — PROPOFOL 10 MG/ML
INJECTION, EMULSION INTRAVENOUS
Status: DISPENSED
Start: 2019-05-23

## (undated) RX ORDER — LIDOCAINE HYDROCHLORIDE 20 MG/ML
INJECTION, SOLUTION EPIDURAL; INFILTRATION; INTRACAUDAL; PERINEURAL
Status: DISPENSED
Start: 2019-05-23

## (undated) RX ORDER — NEOSTIGMINE METHYLSULFATE 1 MG/ML
VIAL (ML) INJECTION
Status: DISPENSED
Start: 2019-05-23

## (undated) RX ORDER — HYDROMORPHONE HYDROCHLORIDE 1 MG/ML
INJECTION, SOLUTION INTRAMUSCULAR; INTRAVENOUS; SUBCUTANEOUS
Status: DISPENSED
Start: 2019-05-23

## (undated) RX ORDER — ACETAMINOPHEN 325 MG/1
TABLET ORAL
Status: DISPENSED
Start: 2019-05-23

## (undated) RX ORDER — GINSENG 100 MG
CAPSULE ORAL
Status: DISPENSED
Start: 2019-05-23

## (undated) RX ORDER — GLYCOPYRROLATE 0.2 MG/ML
INJECTION, SOLUTION INTRAMUSCULAR; INTRAVENOUS
Status: DISPENSED
Start: 2019-05-23

## (undated) RX ORDER — ONDANSETRON 2 MG/ML
INJECTION INTRAMUSCULAR; INTRAVENOUS
Status: DISPENSED
Start: 2019-05-23

## (undated) RX ORDER — LIDOCAINE HYDROCHLORIDE 10 MG/ML
INJECTION, SOLUTION EPIDURAL; INFILTRATION; INTRACAUDAL; PERINEURAL
Status: DISPENSED
Start: 2019-05-23

## (undated) RX ORDER — DEXAMETHASONE SODIUM PHOSPHATE 4 MG/ML
INJECTION, SOLUTION INTRA-ARTICULAR; INTRALESIONAL; INTRAMUSCULAR; INTRAVENOUS; SOFT TISSUE
Status: DISPENSED
Start: 2019-05-23

## (undated) RX ORDER — FENTANYL CITRATE 50 UG/ML
INJECTION, SOLUTION INTRAMUSCULAR; INTRAVENOUS
Status: DISPENSED
Start: 2019-05-23

## (undated) RX ORDER — CEFOTETAN DISODIUM 1 G/10ML
INJECTION, POWDER, FOR SOLUTION INTRAMUSCULAR; INTRAVENOUS
Status: DISPENSED
Start: 2019-05-23

## (undated) RX ORDER — BUPIVACAINE HYDROCHLORIDE AND EPINEPHRINE 2.5; 5 MG/ML; UG/ML
INJECTION, SOLUTION EPIDURAL; INFILTRATION; INTRACAUDAL; PERINEURAL
Status: DISPENSED
Start: 2019-05-23